# Patient Record
Sex: MALE | Race: BLACK OR AFRICAN AMERICAN | URBAN - METROPOLITAN AREA
[De-identification: names, ages, dates, MRNs, and addresses within clinical notes are randomized per-mention and may not be internally consistent; named-entity substitution may affect disease eponyms.]

---

## 2021-02-23 ENCOUNTER — OFFICE VISIT (OUTPATIENT)
Dept: FAMILY MEDICINE | Facility: CLINIC | Age: 21
End: 2021-02-23
Payer: COMMERCIAL

## 2021-02-23 VITALS
TEMPERATURE: 97.5 F | RESPIRATION RATE: 12 BRPM | WEIGHT: 143 LBS | BODY MASS INDEX: 21.18 KG/M2 | HEART RATE: 87 BPM | OXYGEN SATURATION: 100 % | HEIGHT: 69 IN | SYSTOLIC BLOOD PRESSURE: 121 MMHG | DIASTOLIC BLOOD PRESSURE: 79 MMHG

## 2021-02-23 DIAGNOSIS — R10.32 ABDOMINAL PAIN, LEFT LOWER QUADRANT: Primary | ICD-10-CM

## 2021-02-23 ASSESSMENT — PAIN SCALES - GENERAL: PAINLEVEL: NO PAIN (0)

## 2021-02-23 ASSESSMENT — MIFFLIN-ST. JEOR: SCORE: 1640.6

## 2021-02-23 NOTE — PROGRESS NOTES
"SUBJECTIVE:   Landon Graf is a 20 year old male who presents to clinic today to discuss the following problem(s).    Left lower abdominal pain for several months  - doesn't really remember the \"beginning\"  - at first it wasn't very bad  - for the past month there have been times where it has actually \"scared\" him with episodes of more severe pain  - recently it's been almost every day some level of pain  - pain is episodic, at most lasting for half a day or so  - BMs every day and a half or so  - does report feeling like he sometimes has to strain at the end of a BM  - [place] chart is mostly in the normal range  - diet is mostly bread, meat, cheese, lately more fruit vegetables  - denies fever or chills, no nausea  - no blood with BMs  - no gross hematuria, no pain associated with urination, no flank pain  - no unusual abdominal masses     ROS: 10 point ROS neg other than the symptoms noted above in the HPI.    Today's PHQ-2:  PHQ-2 (  [employer]) 2021   Q1: Little interest or pleasure in doing things 1   Q2: Feeling down, depressed or hopeless 0   PHQ-2 Score 1       History reviewed. No pertinent past medical history.  History reviewed. No pertinent surgical history.  History reviewed. No pertinent family history.  Social History     Tobacco Use     Smoking status: Former Smoker     Quit date: 10/30/2020     Years since quittin.3     Smokeless tobacco: Current User   Substance Use Topics     Alcohol use: Not Currently     Drug use: Not Currently     Social History     Social History Narrative     Not on file       Current Outpatient Medications   Medication     Ferrous Sulfate (IRON PO)     Omega-3 Fatty Acids (FISH OIL PO)     VITAMIN D PO     No current facility-administered medications for this visit.      I have reviewed the patient's past medical, surgical, family, and social history.     OBJECTIVE:   /79 (BP Location: Right arm, Patient Position: Chair, Cuff Size: Adult Regular)   Pulse " "87   Temp 97.5  F (36.4  C) (Temporal)   Resp 12   Ht 1.755 m (5' 9.1\")   Wt 64.9 kg (143 lb)   SpO2 100%   BMI 21.06 kg/m      Constitutional: well-appearing, appears stated age  Eyes: conjunctivae without erythema, sclera anicteric.   Cardiac: regular rate and rhythm, normal S1/S2, no murmur/rubs/gallops  Respiratory: lungs clear to auscultation bilaterally, normal work of breathing, no wheezes/crackles  Abdomen: soft with some guarding along LEFT quadrants, BS+, no HSM  Skin: no rashes, lesions, or wounds  Psych: affect is full and appropriate, speech is fluent and non-pressured    ASSESSMENT AND PLAN:     Landon was seen today for abdominal pain.    Diagnoses and all orders for this visit:    Abdominal pain, left lower quadrant    Considered DDX includes abdominal hernia, diverticulitis/viral gastroenteritis, kidney stone, bladder infection/pyelo, constipation, malignancy, musculoskeletal or neurologic anomaly. At this point, chief suspicion is for some degree of worsening constipation. Will try a short course of daily miralax. Encouraged patient to stay in touch with me via [employer] for updates on status. If symptoms fail to improve with Miralax, at this point I would consider abdominal XR vs CT scan. Less likely colonoscopy or renal ultrasound.       Michael Bolanos MD  [facility]  02/23/2021, 4:11 PM    "

## 2021-02-23 NOTE — NURSING NOTE
"20 year old  Chief Complaint   Patient presents with     Abdominal Pain     complains of having lower left side abdominal pain for several months. Pain started after a meal.       Blood pressure 121/79, pulse 87, temperature 97.5  F (36.4  C), temperature source Temporal, resp. rate 12, height 1.755 m (5' 9.1\"), weight 64.9 kg (143 lb), SpO2 100 %. Body mass index is 21.06 kg/m .  There is no problem list on file for this patient.      Wt Readings from Last 2 Encounters:   21 64.9 kg (143 lb)     BP Readings from Last 3 Encounters:   21 121/79         Current Outpatient Medications   Medication     Ferrous Sulfate (IRON PO)     Omega-3 Fatty Acids (FISH OIL PO)     VITAMIN D PO     No current facility-administered medications for this visit.        Social History     Tobacco Use     Smoking status: Former Smoker     Quit date: 10/30/2020     Years since quittin.3     Smokeless tobacco: Current User   Substance Use Topics     Alcohol use: Not Currently     Drug use: Not Currently       Health Maintenance Due   Topic Date Due     PREVENTIVE CARE VISIT  1998     ADVANCE CARE PLANNING  1998     DTAP/TDAP/TD IMMUNIZATION (1 - Tdap) 2005     HPV IMMUNIZATION (1 - Male 2-dose series) 2009     HIV SCREENING  2013     HEPATITIS C SCREENING  2016     INFLUENZA VACCINE (1) 2020       No results found for: BRANDON Barrera CMA, DAR  2021 3:59 PM  "

## 2021-03-07 ENCOUNTER — HEALTH MAINTENANCE LETTER (OUTPATIENT)
Age: 21
End: 2021-03-07

## 2021-10-11 ENCOUNTER — HEALTH MAINTENANCE LETTER (OUTPATIENT)
Age: 21
End: 2021-10-11

## 2022-03-27 ENCOUNTER — HEALTH MAINTENANCE LETTER (OUTPATIENT)
Age: 22
End: 2022-03-27

## 2022-09-25 ENCOUNTER — HEALTH MAINTENANCE LETTER (OUTPATIENT)
Age: 22
End: 2022-09-25

## 2023-05-08 ENCOUNTER — HEALTH MAINTENANCE LETTER (OUTPATIENT)
Age: 23
End: 2023-05-08